# Patient Record
Sex: FEMALE | Race: ASIAN | NOT HISPANIC OR LATINO
[De-identification: names, ages, dates, MRNs, and addresses within clinical notes are randomized per-mention and may not be internally consistent; named-entity substitution may affect disease eponyms.]

---

## 2019-11-04 ENCOUNTER — APPOINTMENT (OUTPATIENT)
Dept: CARDIOLOGY | Facility: CLINIC | Age: 79
End: 2019-11-04

## 2019-12-04 ENCOUNTER — APPOINTMENT (OUTPATIENT)
Dept: CARDIOLOGY | Facility: CLINIC | Age: 79
End: 2019-12-04
Payer: MEDICARE

## 2019-12-04 ENCOUNTER — NON-APPOINTMENT (OUTPATIENT)
Age: 79
End: 2019-12-04

## 2019-12-04 VITALS
HEART RATE: 55 BPM | OXYGEN SATURATION: 96 % | RESPIRATION RATE: 16 BRPM | DIASTOLIC BLOOD PRESSURE: 82 MMHG | BODY MASS INDEX: 22.03 KG/M2 | WEIGHT: 129 LBS | SYSTOLIC BLOOD PRESSURE: 164 MMHG | TEMPERATURE: 97.4 F

## 2019-12-04 DIAGNOSIS — R00.2 PALPITATIONS: ICD-10-CM

## 2019-12-04 DIAGNOSIS — I34.0 NONRHEUMATIC MITRAL (VALVE) INSUFFICIENCY: ICD-10-CM

## 2019-12-04 DIAGNOSIS — R07.89 OTHER CHEST PAIN: ICD-10-CM

## 2019-12-04 PROCEDURE — 93000 ELECTROCARDIOGRAM COMPLETE: CPT | Mod: 59

## 2019-12-04 PROCEDURE — ZZZZZ: CPT

## 2019-12-04 PROCEDURE — 99214 OFFICE O/P EST MOD 30 MIN: CPT | Mod: 25

## 2019-12-04 PROCEDURE — 93015 CV STRESS TEST SUPVJ I&R: CPT

## 2019-12-04 PROCEDURE — 93306 TTE W/DOPPLER COMPLETE: CPT

## 2019-12-04 RX ORDER — HYDROCHLOROTHIAZIDE 12.5 MG/1
12.5 CAPSULE ORAL
Qty: 30 | Refills: 5 | Status: ACTIVE | COMMUNITY
Start: 2019-12-04 | End: 1900-01-01

## 2019-12-04 RX ORDER — BISOPROLOL FUMARATE 5 MG/1
5 TABLET, FILM COATED ORAL DAILY
Qty: 30 | Refills: 5 | Status: ACTIVE | COMMUNITY
Start: 2019-12-04 | End: 1900-01-01

## 2019-12-22 PROBLEM — R07.89 CHEST DISCOMFORT: Status: ACTIVE | Noted: 2019-12-04

## 2019-12-22 RX ORDER — BISOPROLOL FUMARATE AND HYDROCHLOROTHIAZIDE 5; 6.25 MG/1; MG/1
5-6.25 TABLET, FILM COATED ORAL DAILY
Qty: 30 | Refills: 5 | Status: DISCONTINUED | COMMUNITY
Start: 2019-12-04 | End: 2019-12-22

## 2020-02-13 ENCOUNTER — APPOINTMENT (OUTPATIENT)
Dept: CARDIOLOGY | Facility: CLINIC | Age: 80
End: 2020-02-13
Payer: MEDICARE

## 2020-02-13 VITALS
TEMPERATURE: 97.9 F | BODY MASS INDEX: 22.88 KG/M2 | OXYGEN SATURATION: 96 % | SYSTOLIC BLOOD PRESSURE: 127 MMHG | DIASTOLIC BLOOD PRESSURE: 72 MMHG | HEART RATE: 72 BPM | RESPIRATION RATE: 17 BRPM | WEIGHT: 134 LBS

## 2020-02-13 PROCEDURE — 99214 OFFICE O/P EST MOD 30 MIN: CPT

## 2020-02-18 NOTE — REASON FOR VISIT
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 Patient reports elevated BP in the mornings. Patient is advised to take Losartan 25 mg at night instead of during the day to help lower blood pressure in the mornings. She is on Bisoprolol 5 mg and HCTZ 12.5 mg. I advised patient to undergo an echocardiogram and a treadmill stress test.

## 2020-02-18 NOTE — PHYSICAL EXAM
[General Appearance - Well Developed] : well developed [Normal Appearance] : normal appearance [Well Groomed] : well groomed [General Appearance - Well Nourished] : well nourished [No Deformities] : no deformities [General Appearance - In No Acute Distress] : no acute distress [Normal Conjunctiva] : the conjunctiva exhibited no abnormalities [Eyelids - No Xanthelasma] : the eyelids demonstrated no xanthelasmas [Normal Oral Mucosa] : normal oral mucosa [No Oral Pallor] : no oral pallor [No Oral Cyanosis] : no oral cyanosis [Normal Jugular Venous A Waves Present] : normal jugular venous A waves present [Normal Jugular Venous V Waves Present] : normal jugular venous V waves present [No Jugular Venous Rosenbaum A Waves] : no jugular venous rosenbaum A waves [Exaggerated Use Of Accessory Muscles For Inspiration] : no accessory muscle use [Respiration, Rhythm And Depth] : normal respiratory rhythm and effort [Auscultation Breath Sounds / Voice Sounds] : lungs were clear to auscultation bilaterally [Heart Rate And Rhythm] : heart rate and rhythm were normal [Heart Sounds] : normal S1 and S2 [Arterial Pulses Normal] : the arterial pulses were normal [Edema] : no peripheral edema present [Systolic grade ___/6] : A grade [unfilled]/6 systolic murmur was heard. [Abdomen Soft] : soft [Abdomen Tenderness] : non-tender [Abdomen Mass (___ Cm)] : no abdominal mass palpated [Abnormal Walk] : normal gait [Nail Clubbing] : no clubbing of the fingernails [Cyanosis, Localized] : no localized cyanosis [Petechial Hemorrhages (___cm)] : no petechial hemorrhages [] : no ischemic changes [Oriented To Time, Place, And Person] : oriented to person, place, and time [Affect] : the affect was normal [Mood] : the mood was normal [No Anxiety] : not feeling anxious

## 2020-02-18 NOTE — REVIEW OF SYSTEMS
[Shortness Of Breath] : shortness of breath [Dyspnea on exertion] : dyspnea during exertion [Lower Ext Edema] : no extremity edema [Chest Pain] : no chest pain [Palpitations] : no palpitations [Negative] : Heme/Lymph

## 2020-02-18 NOTE — PHYSICAL EXAM
[Normal Appearance] : normal appearance [General Appearance - Well Developed] : well developed [Well Groomed] : well groomed [General Appearance - Well Nourished] : well nourished [No Deformities] : no deformities [Normal Conjunctiva] : the conjunctiva exhibited no abnormalities [General Appearance - In No Acute Distress] : no acute distress [Eyelids - No Xanthelasma] : the eyelids demonstrated no xanthelasmas [Normal Oral Mucosa] : normal oral mucosa [No Oral Cyanosis] : no oral cyanosis [No Oral Pallor] : no oral pallor [Normal Jugular Venous A Waves Present] : normal jugular venous A waves present [Normal Jugular Venous V Waves Present] : normal jugular venous V waves present [No Jugular Venous Rosenbaum A Waves] : no jugular venous rosenbaum A waves [Respiration, Rhythm And Depth] : normal respiratory rhythm and effort [Exaggerated Use Of Accessory Muscles For Inspiration] : no accessory muscle use [Auscultation Breath Sounds / Voice Sounds] : lungs were clear to auscultation bilaterally [Heart Rate And Rhythm] : heart rate and rhythm were normal [Heart Sounds] : normal S1 and S2 [Arterial Pulses Normal] : the arterial pulses were normal [Systolic grade ___/6] : A grade [unfilled]/6 systolic murmur was heard. [Edema] : no peripheral edema present [Abdomen Soft] : soft [Abdomen Tenderness] : non-tender [Abdomen Mass (___ Cm)] : no abdominal mass palpated [Abnormal Walk] : normal gait [Nail Clubbing] : no clubbing of the fingernails [Cyanosis, Localized] : no localized cyanosis [Petechial Hemorrhages (___cm)] : no petechial hemorrhages [] : no ischemic changes [Affect] : the affect was normal [Oriented To Time, Place, And Person] : oriented to person, place, and time [No Anxiety] : not feeling anxious [Mood] : the mood was normal

## 2020-02-18 NOTE — PHYSICAL EXAM
[General Appearance - Well Developed] : well developed [Normal Appearance] : normal appearance [Well Groomed] : well groomed [General Appearance - Well Nourished] : well nourished [No Deformities] : no deformities [General Appearance - In No Acute Distress] : no acute distress [Normal Conjunctiva] : the conjunctiva exhibited no abnormalities [Eyelids - No Xanthelasma] : the eyelids demonstrated no xanthelasmas [Normal Oral Mucosa] : normal oral mucosa [No Oral Pallor] : no oral pallor [No Oral Cyanosis] : no oral cyanosis [Normal Jugular Venous A Waves Present] : normal jugular venous A waves present [Normal Jugular Venous V Waves Present] : normal jugular venous V waves present [No Jugular Venous Rosenbaum A Waves] : no jugular venous rosenbaum A waves [Respiration, Rhythm And Depth] : normal respiratory rhythm and effort [Exaggerated Use Of Accessory Muscles For Inspiration] : no accessory muscle use [Auscultation Breath Sounds / Voice Sounds] : lungs were clear to auscultation bilaterally [Heart Rate And Rhythm] : heart rate and rhythm were normal [Arterial Pulses Normal] : the arterial pulses were normal [Heart Sounds] : normal S1 and S2 [Edema] : no peripheral edema present [Systolic grade ___/6] : A grade [unfilled]/6 systolic murmur was heard. [Abdomen Soft] : soft [Abdomen Tenderness] : non-tender [Abdomen Mass (___ Cm)] : no abdominal mass palpated [Abnormal Walk] : normal gait [Nail Clubbing] : no clubbing of the fingernails [Cyanosis, Localized] : no localized cyanosis [Petechial Hemorrhages (___cm)] : no petechial hemorrhages [] : no ischemic changes [Oriented To Time, Place, And Person] : oriented to person, place, and time [Affect] : the affect was normal [Mood] : the mood was normal [No Anxiety] : not feeling anxious

## 2020-02-18 NOTE — REASON FOR VISIT
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 Patient reports elevated BP in the mornings. Patient is advised to take Losartan 25 mg at night instead of during the day to help lower blood pressure in the mornings. She is on Bisoprolol 5 mg and HCTZ 12.5 mg. I advised patient to undergo an echocardiogram and a treadmill stress test.

## 2020-02-18 NOTE — REASON FOR VISIT
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 Patient reports elevated BP in the mornings. Patient is advised to take Losartan 25 mg at night instead of during the day to help lower blood pressure in the mornings. She is on Bisoprolol 5 mg and HCTZ 12.5 mg. I advised patient to undergo an echocardiogram and a treadmill stress test.

## 2020-09-12 NOTE — PHYSICAL EXAM
[General Appearance - Well Developed] : well developed [Normal Appearance] : normal appearance [Well Groomed] : well groomed [General Appearance - Well Nourished] : well nourished [No Deformities] : no deformities [General Appearance - In No Acute Distress] : no acute distress [Normal Conjunctiva] : the conjunctiva exhibited no abnormalities [Eyelids - No Xanthelasma] : the eyelids demonstrated no xanthelasmas [Normal Oral Mucosa] : normal oral mucosa [No Oral Pallor] : no oral pallor [Normal Jugular Venous A Waves Present] : normal jugular venous A waves present [No Oral Cyanosis] : no oral cyanosis [Normal Jugular Venous V Waves Present] : normal jugular venous V waves present [No Jugular Venous Rosenbaum A Waves] : no jugular venous rosenbaum A waves [Respiration, Rhythm And Depth] : normal respiratory rhythm and effort [Exaggerated Use Of Accessory Muscles For Inspiration] : no accessory muscle use [Auscultation Breath Sounds / Voice Sounds] : lungs were clear to auscultation bilaterally [Heart Rate And Rhythm] : heart rate and rhythm were normal [Heart Sounds] : normal S1 and S2 [Arterial Pulses Normal] : the arterial pulses were normal [Systolic grade ___/6] : A grade [unfilled]/6 systolic murmur was heard. [Edema] : no peripheral edema present [Abdomen Soft] : soft [Abdomen Tenderness] : non-tender [Abdomen Mass (___ Cm)] : no abdominal mass palpated [Abnormal Walk] : normal gait [Cyanosis, Localized] : no localized cyanosis [Petechial Hemorrhages (___cm)] : no petechial hemorrhages [Nail Clubbing] : no clubbing of the fingernails [Oriented To Time, Place, And Person] : oriented to person, place, and time [] : no ischemic changes [Affect] : the affect was normal [Mood] : the mood was normal [No Anxiety] : not feeling anxious

## 2020-09-14 ENCOUNTER — APPOINTMENT (OUTPATIENT)
Dept: CARDIOLOGY | Facility: CLINIC | Age: 80
End: 2020-09-14
Payer: MEDICARE

## 2020-09-14 ENCOUNTER — NON-APPOINTMENT (OUTPATIENT)
Age: 80
End: 2020-09-14

## 2020-09-14 VITALS
RESPIRATION RATE: 17 BRPM | SYSTOLIC BLOOD PRESSURE: 149 MMHG | WEIGHT: 135 LBS | TEMPERATURE: 97.6 F | DIASTOLIC BLOOD PRESSURE: 77 MMHG | OXYGEN SATURATION: 97 % | BODY MASS INDEX: 23.05 KG/M2 | HEART RATE: 63 BPM

## 2020-09-14 PROCEDURE — 93000 ELECTROCARDIOGRAM COMPLETE: CPT

## 2020-09-14 PROCEDURE — 99214 OFFICE O/P EST MOD 30 MIN: CPT

## 2020-09-16 NOTE — HISTORY OF PRESENT ILLNESS
[FreeTextEntry1] : 80-year-old female with HTN and breast CA presents for followup. \par \par Patient was last seen on 2/13/20.  She is on Bisoprolol and HCTZ along with Losartan 25 mg.\par \par OLD NOTE - \par \par  6/17/15. Patient underwent a stress echo and completed 5 minutes of Joel protocol. There were upsloping ST depressions on ECG but there was no echocardiographic evidence of ischemia. Patient returned today for followup.

## 2020-09-16 NOTE — REASON FOR VISIT
[FreeTextEntry1] : 9/14/20 - stopped both Bisoprolol HCTZ 5-6.25 mg and Losartan 25 mg because her BP was good.  Her BP increased so she is back on both for the past month. Patient reports feeling dizzy when she turns.   Patient reports SOB with exertion. Patient denies CP.  I advised patient to get CXR. Patient had high pulmonary pressure due to mitral valve regurgitation. I advised patient to keep her BP low.  I advised patient to undergo a pharmacologic nuclear stress test.  I will obtain insurance authorization. 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  Patient reports elevated BP in the mornings. Patient is advised to take Losartan 25 mg at night instead of during the day to help lower blood pressure in the mornings.  She is on Bisoprolol 5 mg and HCTZ 12.5 mg.

## 2020-10-06 ENCOUNTER — APPOINTMENT (OUTPATIENT)
Dept: CARDIOLOGY | Facility: CLINIC | Age: 80
End: 2020-10-06
Payer: MEDICARE

## 2020-10-06 PROCEDURE — A9500: CPT

## 2020-10-06 PROCEDURE — 93015 CV STRESS TEST SUPVJ I&R: CPT

## 2020-10-06 PROCEDURE — 78452 HT MUSCLE IMAGE SPECT MULT: CPT

## 2020-10-07 RX ORDER — ASPIRIN 81 MG/1
81 TABLET, DELAYED RELEASE ORAL DAILY
Qty: 30 | Refills: 5 | Status: ACTIVE | COMMUNITY
Start: 2020-10-07 | End: 1900-01-01

## 2020-10-16 NOTE — DISCUSSION/SUMMARY
[FreeTextEntry1] : 79-year-old female with HTN and breast CA presents for followup.  Patient was last seen on 6/17/15.  Patient underwent a stress echo and completed 5 minutes of Joel protocol.  There were upsloping ST depressions on ECG but there was no echocardiographic evidence of ischemia.  Patient returned today for followup.  Patient reports occasional CP, described as palpitations or anxious feeling lasting seconds. Patient reports COLLINS.  She is on Ziac 5-6.25 mg for HTN.\par \par (1) CP, SOB with exertion - Patient underwent an echocardiogram and it showed normal LV function with moderate MR, AR, TR, and mild pulmonary hypertension (PASP 46 mmHg).  Patient underwent a treadmill stress test and completed 5 minutes of Joel protocol.  There were upsloping ST depressions on ECG but no symptoms.  Following treadmill stress, there was no echocardiographic evidence of ischemia.  PASP increased to 60 mg following treadmill stress.  I advised patient to change Ziac 5-6.25 mg to Bisoprolol 5 mg and HCTZ 12.5 mg.\par \par (2) Followup - 3 months.

## 2020-10-16 NOTE — HISTORY OF PRESENT ILLNESS
[FreeTextEntry1] : 79-year-old female with HTN and breast CA presents for followup.  Patient was last seen on 6/17/15.  Patient underwent a stress echo and completed 5 minutes of Joel protocol.  There were upsloping ST depressions on ECG but there was no echocardiographic evidence of ischemia.  Patient returned today for followup.  Patient reports occasional CP, described as palpitations or anxious feeling lasting seconds. Patient reports COLLINS.  She is on Ziac 5-6.25 mg for HTN.

## 2021-01-04 NOTE — PHYSICAL EXAM
[General Appearance - Well Developed] : well developed [Normal Appearance] : normal appearance [Well Groomed] : well groomed [General Appearance - Well Nourished] : well nourished [No Deformities] : no deformities [General Appearance - In No Acute Distress] : no acute distress [Normal Conjunctiva] : the conjunctiva exhibited no abnormalities [Eyelids - No Xanthelasma] : the eyelids demonstrated no xanthelasmas [Normal Oral Mucosa] : normal oral mucosa [No Oral Pallor] : no oral pallor [No Oral Cyanosis] : no oral cyanosis [Normal Jugular Venous A Waves Present] : normal jugular venous A waves present [Normal Jugular Venous V Waves Present] : normal jugular venous V waves present [No Jugular Venous Rosenbaum A Waves] : no jugular venous rosenbaum A waves [Respiration, Rhythm And Depth] : normal respiratory rhythm and effort [Exaggerated Use Of Accessory Muscles For Inspiration] : no accessory muscle use [Auscultation Breath Sounds / Voice Sounds] : lungs were clear to auscultation bilaterally [Heart Rate And Rhythm] : heart rate and rhythm were normal [Heart Sounds] : normal S1 and S2 [Arterial Pulses Normal] : the arterial pulses were normal [Edema] : no peripheral edema present [Systolic grade ___/6] : A grade [unfilled]/6 systolic murmur was heard. [Abdomen Soft] : soft [Abdomen Tenderness] : non-tender [Abdomen Mass (___ Cm)] : no abdominal mass palpated [Abnormal Walk] : normal gait [Nail Clubbing] : no clubbing of the fingernails [Cyanosis, Localized] : no localized cyanosis [Petechial Hemorrhages (___cm)] : no petechial hemorrhages [] : no ischemic changes [Oriented To Time, Place, And Person] : oriented to person, place, and time [Affect] : the affect was normal [Mood] : the mood was normal [No Anxiety] : not feeling anxious

## 2021-01-06 ENCOUNTER — APPOINTMENT (OUTPATIENT)
Dept: CARDIOLOGY | Facility: CLINIC | Age: 81
End: 2021-01-06
Payer: MEDICARE

## 2021-01-06 VITALS
BODY MASS INDEX: 23.56 KG/M2 | HEART RATE: 68 BPM | WEIGHT: 138 LBS | RESPIRATION RATE: 18 BRPM | TEMPERATURE: 97.5 F | DIASTOLIC BLOOD PRESSURE: 92 MMHG | SYSTOLIC BLOOD PRESSURE: 174 MMHG | OXYGEN SATURATION: 96 %

## 2021-01-06 PROCEDURE — 93306 TTE W/DOPPLER COMPLETE: CPT

## 2021-01-06 PROCEDURE — 99214 OFFICE O/P EST MOD 30 MIN: CPT

## 2021-01-06 PROCEDURE — 99072 ADDL SUPL MATRL&STAF TM PHE: CPT

## 2021-01-06 RX ORDER — LOSARTAN POTASSIUM 50 MG/1
50 TABLET, FILM COATED ORAL DAILY
Qty: 30 | Refills: 5 | Status: ACTIVE | COMMUNITY
Start: 1900-01-01 | End: 1900-01-01

## 2021-02-03 NOTE — DISCUSSION/SUMMARY
[FreeTextEntry1] : 80-year-old female with possible CAD (mild distal LAD ischemia on nuclear stress), HTN and breast CA presents for followup. \par \par Patient was last seen on 9/14/20 for SOB.  Patient underwent a pharmacologic nuclear stress test and it showed mild distal LAD ischemia.   I advised patient to start ASA 81 mg and Simvastatin 10 mg.  She is on Bisoprolol and HCTZ along with Losartan 25 mg for HTN.\par \par 1/6/21 - Patient has been stable.  Patient denies CP.  Patient reports stable SOB with stairs.  Patient denies palpitations.  She is taking her medications.  Her SBP at home ranges from 110-150.  Her BP was elevated initially but was acceptable on repeat measurement (140/80).  I advised patient to undergo an echocardiogram.  I advised patient to increase Losartan to 50 mg.\par \par (1) Possible CAD (mild distal LAD ischemia on nuclear stress) - Patient has been stable.  I advised patient to continue ASA 81 mg and Simvastatin 10 mg.\par \par (2) MR, AR, TR - Patient underwent an echocardiogram and it showed normal LV function with mild MR, moderate AR, and moderate TR, not significantly changed.  Patient was reassured. \par \par (3) HTN - Her BP was elevated.  I advised patient to increase Losartan to 50 mg.\par \par (4) Followup - 6 months. \par

## 2021-02-03 NOTE — HISTORY OF PRESENT ILLNESS
[FreeTextEntry1] : 80-year-old female with possible CAD (mild distal LAD ischemia on nuclear stress), HTN and breast CA presents for followup. \par \par Patient was last seen on 9/14/20 for SOB.  Patient underwent a pharmacologic nuclear stress test and it showed mild distal LAD ischemia.   I advised patient to start ASA 81 mg and Simvastatin 10 mg.  She is on Bisoprolol and HCTZ along with Losartan 25 mg for HTN.\par \par 1/6/21 - Patient has been stable.  Patient denies CP.  Patient reports stable SOB with stairs.  Patient denies palpitations.  She is taking her medications.  Her SBP at home ranges from 110-150.  Her BP was elevated initially but was acceptable on repeat measurement (140/80).  I advised patient to undergo an echocardiogram.  I advised patient to increase Losartan to 50 mg.\par \par

## 2021-02-03 NOTE — REASON FOR VISIT
[Follow-Up - Clinic] : a clinic follow-up of [Hypertension] : hypertension [FreeTextEntry1] : 1/6/21 - Patient has been stable.  Patient denies CP.  Patient reports stable SOB with stairs.  Patient denies palpitations.  She is taking her medications.  Her SBP at home ranges from 110-150.  Her BP was elevated initially but was acceptable on repeat measurement (140/80).  I advised patient to undergo an echocardiogram.  I advised patient to increase Losartan to 50 mg.\par \par 9/14/20 - stopped both Bisoprolol HCTZ 5-6.25 mg and Losartan 25 mg because her BP was good.  Her BP increased so she is back on both for the past month. Patient reports feeling dizzy when she turns.   Patient reports SOB with exertion. Patient denies CP.  I advised patient to get CXR. Patient had high pulmonary pressure due to mitral valve regurgitation. I advised patient to keep her BP low.  I advised patient to undergo a pharmacologic nuclear stress test.  I will obtain insurance authorization. 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  Patient reports elevated BP in the mornings. Patient is advised to take Losartan 25 mg at night instead of during the day to help lower blood pressure in the mornings.  She is on Bisoprolol 5 mg and HCTZ 12.5 mg.

## 2021-05-15 NOTE — PHYSICAL EXAM
[Normal Appearance] : normal appearance [General Appearance - Well Developed] : well developed [Well Groomed] : well groomed [General Appearance - Well Nourished] : well nourished [No Deformities] : no deformities [General Appearance - In No Acute Distress] : no acute distress [Normal Conjunctiva] : the conjunctiva exhibited no abnormalities [Eyelids - No Xanthelasma] : the eyelids demonstrated no xanthelasmas [Normal Oral Mucosa] : normal oral mucosa [No Oral Pallor] : no oral pallor [No Oral Cyanosis] : no oral cyanosis [Normal Jugular Venous A Waves Present] : normal jugular venous A waves present [Normal Jugular Venous V Waves Present] : normal jugular venous V waves present [No Jugular Venous Rosenbaum A Waves] : no jugular venous rosenbaum A waves [Respiration, Rhythm And Depth] : normal respiratory rhythm and effort [Exaggerated Use Of Accessory Muscles For Inspiration] : no accessory muscle use [Auscultation Breath Sounds / Voice Sounds] : lungs were clear to auscultation bilaterally [Heart Sounds] : normal S1 and S2 [Heart Rate And Rhythm] : heart rate and rhythm were normal [Arterial Pulses Normal] : the arterial pulses were normal [Edema] : no peripheral edema present [Systolic grade ___/6] : A grade [unfilled]/6 systolic murmur was heard. [Abdomen Soft] : soft [Abdomen Tenderness] : non-tender [Abdomen Mass (___ Cm)] : no abdominal mass palpated [Abnormal Walk] : normal gait [Nail Clubbing] : no clubbing of the fingernails [Cyanosis, Localized] : no localized cyanosis [Petechial Hemorrhages (___cm)] : no petechial hemorrhages [] : no ischemic changes [Affect] : the affect was normal [Oriented To Time, Place, And Person] : oriented to person, place, and time [Mood] : the mood was normal [No Anxiety] : not feeling anxious

## 2021-05-19 ENCOUNTER — APPOINTMENT (OUTPATIENT)
Dept: CARDIOLOGY | Facility: CLINIC | Age: 81
End: 2021-05-19
Payer: MEDICARE

## 2021-05-19 ENCOUNTER — NON-APPOINTMENT (OUTPATIENT)
Age: 81
End: 2021-05-19

## 2021-05-19 VITALS
BODY MASS INDEX: 23.9 KG/M2 | WEIGHT: 140 LBS | DIASTOLIC BLOOD PRESSURE: 71 MMHG | SYSTOLIC BLOOD PRESSURE: 131 MMHG | HEART RATE: 82 BPM | OXYGEN SATURATION: 95 % | RESPIRATION RATE: 18 BRPM | TEMPERATURE: 97 F

## 2021-05-19 PROCEDURE — 93000 ELECTROCARDIOGRAM COMPLETE: CPT

## 2021-05-19 PROCEDURE — 99072 ADDL SUPL MATRL&STAF TM PHE: CPT

## 2021-05-19 PROCEDURE — 99214 OFFICE O/P EST MOD 30 MIN: CPT

## 2021-05-19 NOTE — HISTORY OF PRESENT ILLNESS
[FreeTextEntry1] : 80-year-old female with possible CAD (mild distal LAD ischemia on nuclear stress), HTN and breast CA presents for followup. \par \par Patient was last seen on 1/6/21.  Patient underwent an echocardiogram and it showed normal LV function with moderate AR and TR.  I advised patient to increase Losartan to 50 mg.  She is on ASA 81 mg and Simvastatin 10 mg for cardioprotection.  She is on Bisoprolol and HCTZ for HTN.\par \par Last nuclear was on 10/6/20 and it showed mild distal LAD ischemia.

## 2021-05-19 NOTE — REASON FOR VISIT
Home [Symptom and Test Evaluation] : symptom and test evaluation [Hypertension] : hypertension [FreeTextEntry1] : 5/19/21 - Patient reports low BP with Losartan so she stopped.  She is onl Bisoprolol HCTZ only for HTN.  She is on ASA 81 mg and Simvastatin 10 mg for HTN.  Patient reports COLLINS.  Patient denies CP.  I advised patient to check ProBNP.  I advised patient to have a CXR to rule out lung pathology.  I will consider cardiac cath if CXR is normal.\par \par 1/6/21 - Patient has been stable.  Patient denies CP.  Patient reports stable SOB with stairs.  Patient denies palpitations.  She is taking her medications.  Her SBP at home ranges from 110-150.  Her BP was elevated initially but was acceptable on repeat measurement (140/80).  I advised patient to undergo an echocardiogram.  I advised patient to increase Losartan to 50 mg.\par \par 9/14/20 - stopped both Bisoprolol HCTZ 5-6.25 mg and Losartan 25 mg because her BP was good.  Her BP increased so she is back on both for the past month. Patient reports feeling dizzy when she turns.   Patient reports SOB with exertion. Patient denies CP.  I advised patient to get CXR. Patient had high pulmonary pressure due to mitral valve regurgitation. I advised patient to keep her BP low.  I advised patient to undergo a pharmacologic nuclear stress test.  I will obtain insurance authorization. 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  Patient reports elevated BP in the mornings. Patient is advised to take Losartan 25 mg at night instead of during the day to help lower blood pressure in the mornings.  She is on Bisoprolol 5 mg and HCTZ 12.5 mg.

## 2021-05-20 LAB
ALBUMIN SERPL ELPH-MCNC: 4.6 G/DL
ALP BLD-CCNC: 45 U/L
ALT SERPL-CCNC: 20 U/L
ANION GAP SERPL CALC-SCNC: 14 MMOL/L
AST SERPL-CCNC: 32 U/L
BILIRUB SERPL-MCNC: 0.8 MG/DL
BUN SERPL-MCNC: 15 MG/DL
CALCIUM SERPL-MCNC: 10.3 MG/DL
CHLORIDE SERPL-SCNC: 103 MMOL/L
CO2 SERPL-SCNC: 23 MMOL/L
CREAT SERPL-MCNC: 0.79 MG/DL
GLUCOSE SERPL-MCNC: 126 MG/DL
NT-PROBNP SERPL-MCNC: 82 PG/ML
POTASSIUM SERPL-SCNC: 3.6 MMOL/L
PROT SERPL-MCNC: 7 G/DL
SODIUM SERPL-SCNC: 140 MMOL/L

## 2021-06-10 RX ORDER — SIMVASTATIN 10 MG/1
10 TABLET, FILM COATED ORAL
Qty: 30 | Refills: 5 | Status: ACTIVE | COMMUNITY
Start: 2020-10-07 | End: 1900-01-01

## 2021-09-20 ENCOUNTER — APPOINTMENT (OUTPATIENT)
Dept: CARDIOLOGY | Facility: CLINIC | Age: 81
End: 2021-09-20
Payer: MEDICARE

## 2021-09-20 VITALS — SYSTOLIC BLOOD PRESSURE: 162 MMHG | DIASTOLIC BLOOD PRESSURE: 84 MMHG

## 2021-09-20 VITALS
SYSTOLIC BLOOD PRESSURE: 119 MMHG | DIASTOLIC BLOOD PRESSURE: 74 MMHG | WEIGHT: 138 LBS | TEMPERATURE: 97.8 F | BODY MASS INDEX: 23.56 KG/M2 | RESPIRATION RATE: 18 BRPM | OXYGEN SATURATION: 95 % | HEART RATE: 79 BPM

## 2021-09-20 PROCEDURE — 99214 OFFICE O/P EST MOD 30 MIN: CPT

## 2021-09-20 NOTE — REASON FOR VISIT
[Symptom and Test Evaluation] : symptom and test evaluation [FreeTextEntry1] : 9/20/21 - Patient reports stable COLLINS.  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  Patient reports her SBP in AM is 140/80 and then it goes down to 110-120 in PM.  She is on Bisoprolol 5 mg and HCTZ.  Her BP was elevated.  Her home BP was verified to be accurate.  I advised patient to exercise regularly to see if her SOB gets better.  I will consider cardiac cath if she remains symptomatic.  FU 3 months. \par \par 5/19/21 - Patient reports low BP with Losartan so she stopped.  She is on Bisoprolol HCTZ only for HTN.  She is on ASA 81 mg and Simvastatin 10 mg for HTN.  Patient reports COLLINS.  Patient denies CP.  I advised patient to check ProBNP.  I advised patient to have a CXR to rule out lung pathology.  I will consider cardiac cath if CXR is normal.\par \par 1/6/21 - Patient has been stable.  Patient denies CP.  Patient reports stable SOB with stairs.  Patient denies palpitations.  She is taking her medications.  Her SBP at home ranges from 110-150.  Her BP was elevated initially but was acceptable on repeat measurement (140/80).  I advised patient to undergo an echocardiogram.  I advised patient to increase Losartan to 50 mg.\par \par 9/14/20 - stopped both Bisoprolol HCTZ 5-6.25 mg and Losartan 25 mg because her BP was good.  Her BP increased so she is back on both for the past month. Patient reports feeling dizzy when she turns.   Patient reports SOB with exertion. Patient denies CP.  I advised patient to get CXR. Patient had high pulmonary pressure due to mitral valve regurgitation. I advised patient to keep her BP low.  I advised patient to undergo a pharmacologic nuclear stress test.  I will obtain insurance authorization. 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  Patient reports elevated BP in the mornings. Patient is advised to take Losartan 25 mg at night instead of during the day to help lower blood pressure in the mornings.  She is on Bisoprolol 5 mg and HCTZ 12.5 mg.

## 2021-09-20 NOTE — HISTORY OF PRESENT ILLNESS
[FreeTextEntry1] : 81-year-old female with possible CAD (mild distal LAD ischemia on nuclear stress), HTN and breast CA presents for followup. \par \par Patient was last seen on 5/19/21 for COLLINS.   She is off Losartan 50 mg because of low BP.  ProBNP was checked and it was normal.  Patient underwent a CXR and it showed stable scarring. \par \par She is on ASA 81 mg and Simvastatin 10 mg for cardioprotection.  She is on Bisoprolol and HCTZ for HTN.\par \par Last nuclear was on 10/6/20 and it showed mild distal LAD ischemia. \par \par Patient underwent an echocardiogram 1/6/21 and it showed normal LV function with moderate AR and TR.

## 2022-02-12 NOTE — REASON FOR VISIT
[FreeTextEntry1] : 81-year-old female with possible CAD (mild distal LAD ischemia on nuclear stress), HTN and breast CA presents for followup. \par \par Patient was last seen on 9/20/21 for COLLISN.   Her BP was elevated in office but normal at home.  Her home BP was verified to be accurate.  I advised patient to exercise regularly to see if her SOB gets better.  I wouldconsider cardiac cath if she remains symptomatic.  \par \par She is on ASA 81 mg and Simvastatin 10 mg for cardioprotection.  She is on Bisoprolol and HCTZ for HTN.\par \par Last nuclear was on 10/6/20 and it showed mild distal LAD ischemia. \par \par Patient underwent an echocardiogram 1/6/21 and it showed normal LV function with moderate AR and TR. \par \par

## 2022-02-12 NOTE — HISTORY OF PRESENT ILLNESS
[FreeTextEntry1] : 9/20/21 - Patient reports stable COLLINS.  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  Patient reports her SBP in AM is 140/80 and then it goes down to 110-120 in PM.  She is on Bisoprolol 5 mg and HCTZ.  Her BP was elevated.  Her home BP was verified to be accurate.  I advised patient to exercise regularly to see if her SOB gets better.  I will consider cardiac cath if she remains symptomatic.  FU 3 months. \par \par 5/19/21 - Patient reports low BP with Losartan so she stopped.  She is on Bisoprolol HCTZ only for HTN.  She is on ASA 81 mg and Simvastatin 10 mg for HTN.  Patient reports COLLINS.  Patient denies CP.  I advised patient to check ProBNP.  I advised patient to have a CXR to rule out lung pathology.  I will consider cardiac cath if CXR is normal.\par \par 1/6/21 - Patient has been stable.  Patient denies CP.  Patient reports stable SOB with stairs.  Patient denies palpitations.  She is taking her medications.  Her SBP at home ranges from 110-150.  Her BP was elevated initially but was acceptable on repeat measurement (140/80).  I advised patient to undergo an echocardiogram.  I advised patient to increase Losartan to 50 mg.\par \par 9/14/20 - stopped both Bisoprolol HCTZ 5-6.25 mg and Losartan 25 mg because her BP was good.  Her BP increased so she is back on both for the past month. Patient reports feeling dizzy when she turns.   Patient reports SOB with exertion. Patient denies CP.  I advised patient to get CXR. Patient had high pulmonary pressure due to mitral valve regurgitation. I advised patient to keep her BP low.  I advised patient to undergo a pharmacologic nuclear stress test.  I will obtain insurance authorization. 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  Patient reports elevated BP in the mornings. Patient is advised to take Losartan 25 mg at night instead of during the day to help lower blood pressure in the mornings.  She is on Bisoprolol 5 mg and HCTZ 12.5 mg.

## 2022-02-16 ENCOUNTER — NON-APPOINTMENT (OUTPATIENT)
Age: 82
End: 2022-02-16

## 2022-02-16 ENCOUNTER — APPOINTMENT (OUTPATIENT)
Dept: CARDIOLOGY | Facility: CLINIC | Age: 82
End: 2022-02-16
Payer: MEDICARE

## 2022-02-16 VITALS
HEART RATE: 68 BPM | WEIGHT: 139 LBS | RESPIRATION RATE: 18 BRPM | SYSTOLIC BLOOD PRESSURE: 148 MMHG | DIASTOLIC BLOOD PRESSURE: 76 MMHG | BODY MASS INDEX: 23.73 KG/M2 | OXYGEN SATURATION: 94 % | TEMPERATURE: 97.2 F

## 2022-02-16 PROCEDURE — 99214 OFFICE O/P EST MOD 30 MIN: CPT

## 2022-02-16 PROCEDURE — 93000 ELECTROCARDIOGRAM COMPLETE: CPT

## 2022-03-01 ENCOUNTER — APPOINTMENT (OUTPATIENT)
Dept: CARDIOLOGY | Facility: CLINIC | Age: 82
End: 2022-03-01
Payer: MEDICARE

## 2022-03-01 PROCEDURE — 93015 CV STRESS TEST SUPVJ I&R: CPT

## 2022-03-01 PROCEDURE — 78452 HT MUSCLE IMAGE SPECT MULT: CPT

## 2022-03-01 PROCEDURE — A9500: CPT

## 2022-03-03 ENCOUNTER — NON-APPOINTMENT (OUTPATIENT)
Age: 82
End: 2022-03-03

## 2023-01-12 ENCOUNTER — APPOINTMENT (OUTPATIENT)
Dept: CARDIOLOGY | Facility: CLINIC | Age: 83
End: 2023-01-12
Payer: MEDICARE

## 2023-01-12 VITALS
SYSTOLIC BLOOD PRESSURE: 152 MMHG | WEIGHT: 134 LBS | TEMPERATURE: 96.8 F | OXYGEN SATURATION: 95 % | BODY MASS INDEX: 22.88 KG/M2 | RESPIRATION RATE: 16 BRPM | DIASTOLIC BLOOD PRESSURE: 78 MMHG | HEART RATE: 72 BPM

## 2023-01-12 PROCEDURE — 93306 TTE W/DOPPLER COMPLETE: CPT

## 2023-01-12 PROCEDURE — 99214 OFFICE O/P EST MOD 30 MIN: CPT

## 2023-01-16 LAB
ALBUMIN SERPL ELPH-MCNC: 4.3 G/DL
ALP BLD-CCNC: 71 U/L
ALT SERPL-CCNC: 20 U/L
ANION GAP SERPL CALC-SCNC: 12 MMOL/L
AST SERPL-CCNC: 39 U/L
BILIRUB SERPL-MCNC: 0.8 MG/DL
BUN SERPL-MCNC: 14 MG/DL
CALCIUM SERPL-MCNC: 9.9 MG/DL
CHLORIDE SERPL-SCNC: 102 MMOL/L
CO2 SERPL-SCNC: 25 MMOL/L
CREAT SERPL-MCNC: 0.68 MG/DL
DEPRECATED D DIMER PPP IA-ACNC: 177 NG/ML DDU
EGFR: 86 ML/MIN/1.73M2
GLUCOSE SERPL-MCNC: 146 MG/DL
NT-PROBNP SERPL-MCNC: 132 PG/ML
POTASSIUM SERPL-SCNC: 3.9 MMOL/L
PROT SERPL-MCNC: 7 G/DL
SODIUM SERPL-SCNC: 140 MMOL/L

## 2023-04-10 NOTE — REASON FOR VISIT
[FreeTextEntry1] : 82-year-old female with possible CAD (mild distal LAD ischemia on nuclear stress), HTN, breast CA, presents for followup. \par \par Patient was last seen on 2/16/22 for followup.  Patient reported COLLINS.  Patient reported dizziness.  Patient was orthostatic (-120-100).   Patient underwent a pharmacologic nuclear stress test and it showed normal myocardial perfusion.  \par \par She is on ASA 81 mg and Simvastatin 10 mg for cardioprotection.  She is on Bisoprolol- HCTZ 5-6.25 mg and Losartan 25 mg for HTN.\par \par Patient underwent a pharmacologic nuclear stress test and it showed normal myocardial perfusion. \par \par Patient underwent an echocardiogram 1/6/21 and it showed normal LV function with moderate AR and TR.\par \par Nuclear stress on 10/6/20 showed mild distal LAD ischemia. \par

## 2023-04-10 NOTE — HISTORY OF PRESENT ILLNESS
[FreeTextEntry1] : 1/12/23 - Patient reports COLLINS. Patient denies CP. Patient denies palpitations. Patient denies lightheadedness. Her BP in the morning is elevated and is 110 in the evenings.  [place]. I advised patient that she may stop ASA 81 mg and continue on statins. I advised patient to undergo an echocardiogram.  I advised patient to consider getting CTA.\par \par 2/16/22 - Patient reports COLLINS.  Patient reports dizziness.  Patient was orthostatic (-120-100).  2/6 BEATRICE at apex  noted.  She is on Bisoprolol-HCTZ 5-6.25 mg and Losartan 25 mg for HTN.  I advised patient to undergo a pharmacologic nuclear stress test.  I will obtain insurance authorization.  Patient underwent a pharmacologic nuclear stress test and it showed normal myocardial perfusion.  FU 6 months. \par \par 9/20/21 - Patient reports stable COLLINS.  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  Patient reports her SBP in AM is 140/80 and then it goes down to 110-120 in PM.  She is on Bisoprolol 5 mg and HCTZ.  Her BP was elevated.  Her home BP was verified to be accurate.  I advised patient to exercise regularly to see if her SOB gets better.  I will consider cardiac cath if she remains symptomatic.  FU 3 months. \par \par 5/19/21 - Patient reports low BP with Losartan so she stopped.  She is on Bisoprolol HCTZ only for HTN.  She is on ASA 81 mg and Simvastatin 10 mg for HTN.  Patient reports COLLINS.  Patient denies CP.  I advised patient to check ProBNP.  I advised patient to have a CXR to rule out lung pathology.  I will consider cardiac cath if CXR is normal.\par \par 1/6/21 - Patient has been stable.  Patient denies CP.  Patient reports stable SOB with stairs.  Patient denies palpitations.  She is taking her medications.  Her SBP at home ranges from 110-150.  Her BP was elevated initially but was acceptable on repeat measurement (140/80).  I advised patient to undergo an echocardiogram.  I advised patient to increase Losartan to 50 mg.\par \par 9/14/20 - stopped both Bisoprolol HCTZ 5-6.25 mg and Losartan 25 mg because her BP was good.  Her BP increased so she is back on both for the past month. Patient reports feeling dizzy when she turns.   Patient reports SOB with exertion. Patient denies CP.  I advised patient to get CXR. Patient had high pulmonary pressure due to mitral valve regurgitation. I advised patient to keep her BP low.  I advised patient to undergo a pharmacologic nuclear stress test.  I will obtain insurance authorization. 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  Patient reports elevated BP in the mornings. Patient is advised to take Losartan 25 mg at night instead of during the day to help lower blood pressure in the mornings.  She is on Bisoprolol 5 mg and HCTZ 12.5 mg.

## 2023-07-12 NOTE — PHYSICAL EXAM
[General Appearance - Well Developed] : well developed [Normal Appearance] : normal appearance [Well Groomed] : well groomed [General Appearance - Well Nourished] : well nourished [No Deformities] : no deformities [Normal Conjunctiva] : the conjunctiva exhibited no abnormalities [General Appearance - In No Acute Distress] : no acute distress [Normal Oral Mucosa] : normal oral mucosa [Eyelids - No Xanthelasma] : the eyelids demonstrated no xanthelasmas [No Oral Pallor] : no oral pallor [No Oral Cyanosis] : no oral cyanosis [Normal Jugular Venous A Waves Present] : normal jugular venous A waves present [Normal Jugular Venous V Waves Present] : normal jugular venous V waves present [No Jugular Venous Rosenbaum A Waves] : no jugular venous rosenbaum A waves [Respiration, Rhythm And Depth] : normal respiratory rhythm and effort [Exaggerated Use Of Accessory Muscles For Inspiration] : no accessory muscle use [Auscultation Breath Sounds / Voice Sounds] : lungs were clear to auscultation bilaterally [Heart Rate And Rhythm] : heart rate and rhythm were normal [Heart Sounds] : normal S1 and S2 [Arterial Pulses Normal] : the arterial pulses were normal [Edema] : no peripheral edema present [Systolic grade ___/6] : A grade [unfilled]/6 systolic murmur was heard. [Abdomen Soft] : soft [Abdomen Tenderness] : non-tender [Abdomen Mass (___ Cm)] : no abdominal mass palpated [Abnormal Walk] : normal gait [Nail Clubbing] : no clubbing of the fingernails [Cyanosis, Localized] : no localized cyanosis [Petechial Hemorrhages (___cm)] : no petechial hemorrhages [] : no ischemic changes [Oriented To Time, Place, And Person] : oriented to person, place, and time [Affect] : the affect was normal [Mood] : the mood was normal [No Anxiety] : not feeling anxious

## 2023-07-13 ENCOUNTER — APPOINTMENT (OUTPATIENT)
Dept: CARDIOLOGY | Facility: CLINIC | Age: 83
End: 2023-07-13
Payer: MEDICARE

## 2023-07-13 VITALS
RESPIRATION RATE: 16 BRPM | OXYGEN SATURATION: 95 % | DIASTOLIC BLOOD PRESSURE: 80 MMHG | BODY MASS INDEX: 22.71 KG/M2 | HEART RATE: 67 BPM | TEMPERATURE: 96.7 F | SYSTOLIC BLOOD PRESSURE: 147 MMHG | WEIGHT: 133 LBS

## 2023-07-13 PROCEDURE — 99214 OFFICE O/P EST MOD 30 MIN: CPT

## 2023-07-18 LAB
ALBUMIN SERPL ELPH-MCNC: 4.5 G/DL
ALP BLD-CCNC: 54 U/L
ALT SERPL-CCNC: 20 U/L
ANION GAP SERPL CALC-SCNC: 15 MMOL/L
AST SERPL-CCNC: 39 U/L
BILIRUB SERPL-MCNC: 1 MG/DL
BUN SERPL-MCNC: 18 MG/DL
CALCIUM SERPL-MCNC: 10.3 MG/DL
CHLORIDE SERPL-SCNC: 101 MMOL/L
CHOLEST SERPL-MCNC: 160 MG/DL
CO2 SERPL-SCNC: 25 MMOL/L
CREAT SERPL-MCNC: 0.82 MG/DL
EGFR: 70 ML/MIN/1.73M2
GLUCOSE SERPL-MCNC: 125 MG/DL
HDLC SERPL-MCNC: 53 MG/DL
LDLC SERPL CALC-MCNC: 67 MG/DL
NONHDLC SERPL-MCNC: 107 MG/DL
NT-PROBNP SERPL-MCNC: 62 PG/ML
POTASSIUM SERPL-SCNC: 4 MMOL/L
PROT SERPL-MCNC: 7.3 G/DL
SODIUM SERPL-SCNC: 141 MMOL/L
TRIGL SERPL-MCNC: 247 MG/DL

## 2023-07-23 NOTE — HISTORY OF PRESENT ILLNESS
[FreeTextEntry1] : 7/13/23 - Patient reports Covid infection in June w. s/s of cough and fatigue. She still c/o occasional cough and running nose in am. Patient reports stable COLLINS. She denies CP or palpitations. She has been holding Losartan 25 mg because her BP was 90s occasionally. Her BP ranges 110-130s recently.  Patient last had BT on 5/2023. \par \par 1/12/23 - Patient reports COLLINS. Patient denies CP. Patient denies palpitations. Patient denies lightheadedness. Her BP in the morning is elevated and is 110 in the evenings.  Patient [place]. I advised patient that she may stop ASA 81 mg and continue on statins. I advised patient to undergo an echocardiogram.  I advised patient to consider getting CTA.\par \par 2/16/22 - Patient reports COLLINS.  Patient reports dizziness.  Patient was orthostatic (-120-100).  2/6 BEATRICE at apex  noted.  She is on Bisoprolol-HCTZ 5-6.25 mg and Losartan 25 mg for HTN.  I advised patient to undergo a pharmacologic nuclear stress test.  I will obtain insurance authorization.  Patient underwent a pharmacologic nuclear stress test and it showed normal myocardial perfusion.  FU 6 months. \par \par 9/20/21 - Patient reports stable COLLINS.  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  Patient reports her SBP in AM is 140/80 and then it goes down to 110-120 in PM.  She is on Bisoprolol 5 mg and HCTZ.  Her BP was elevated.  Her home BP was verified to be accurate.  I advised patient to exercise regularly to see if her SOB gets better.  I will consider cardiac cath if she remains symptomatic.  FU 3 months. \par \par 5/19/21 - Patient reports low BP with Losartan so she stopped.  She is on Bisoprolol HCTZ only for HTN.  She is on ASA 81 mg and Simvastatin 10 mg for HTN.  Patient reports COLLINS.  Patient denies CP.  I advised patient to check ProBNP.  I advised patient to have a CXR to rule out lung pathology.  I will consider cardiac cath if CXR is normal.\par \par 1/6/21 - Patient has been stable.  Patient denies CP.  Patient reports stable SOB with stairs.  Patient denies palpitations.  She is taking her medications.  Her SBP at home ranges from 110-150.  Her BP was elevated initially but was acceptable on repeat measurement (140/80).  I advised patient to undergo an echocardiogram.  I advised patient to increase Losartan to 50 mg.\par \par 9/14/20 - stopped both Bisoprolol HCTZ 5-6.25 mg and Losartan 25 mg because her BP was good.  Her BP increased so she is back on both for the past month. Patient reports feeling dizzy when she turns.   Patient reports SOB with exertion. Patient denies CP.  I advised patient to get CXR. Patient had high pulmonary pressure due to mitral valve regurgitation. I advised patient to keep her BP low.  I advised patient to undergo a pharmacologic nuclear stress test.  I will obtain insurance authorization. 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  Patient reports elevated BP in the mornings. Patient is advised to take Losartan 25 mg at night instead of during the day to help lower blood pressure in the mornings.  She is on Bisoprolol 5 mg and HCTZ 12.5 mg.

## 2023-07-23 NOTE — REASON FOR VISIT
[FreeTextEntry1] : 82 year-old female with possible CAD (mild distal LAD ischemia on nuclear stress), HTN, breast CA, presents for followup. \par \par Patient was last seen on 1/12/23 - Patient reports COLLINS. Patient denies CP. Patient denies palpitations. Patient denies lightheadedness. Her BP in the morning is elevated and is 110 in the evenings.  [place]. I advised patient that she may stop ASA 81 mg and continue on statins. I advised patient to undergo an echocardiogram.  I advised patient to consider getting CTA.\par \par She is on ASA 81 mg and Simvastatin 10 mg for cardioprotection.  She is on Bisoprolol- HCTZ 5-6.25 mg and Losartan 25 mg for HTN.\par \par Patient underwent an echocardiogram 1/12/23 and it showed normal LV function with moderate AR.\par \par Patient underwent a pharmacologic nuclear stress test 3/1/22 and it showed normal myocardial perfusion. \par \par Patient underwent an echocardiogram 1/6/21 and it showed normal LV function with moderate AR and TR.\par \par Nuclear stress on 10/6/20 showed mild distal LAD ischemia. \par

## 2023-08-21 ENCOUNTER — APPOINTMENT (OUTPATIENT)
Dept: CARDIOLOGY | Facility: CLINIC | Age: 83
End: 2023-08-21
Payer: MEDICARE

## 2023-08-21 ENCOUNTER — NON-APPOINTMENT (OUTPATIENT)
Age: 83
End: 2023-08-21

## 2023-08-21 VITALS
BODY MASS INDEX: 23.22 KG/M2 | WEIGHT: 136 LBS | SYSTOLIC BLOOD PRESSURE: 169 MMHG | TEMPERATURE: 97.6 F | HEART RATE: 65 BPM | OXYGEN SATURATION: 95 % | RESPIRATION RATE: 16 BRPM | DIASTOLIC BLOOD PRESSURE: 78 MMHG

## 2023-08-21 PROCEDURE — 93000 ELECTROCARDIOGRAM COMPLETE: CPT

## 2023-08-21 PROCEDURE — 99214 OFFICE O/P EST MOD 30 MIN: CPT | Mod: 25

## 2023-08-21 NOTE — PHYSICAL EXAM
[General Appearance - Well Developed] : well developed [Normal Appearance] : normal appearance [Well Groomed] : well groomed [No Deformities] : no deformities [General Appearance - Well Nourished] : well nourished [General Appearance - In No Acute Distress] : no acute distress [Normal Conjunctiva] : the conjunctiva exhibited no abnormalities [Eyelids - No Xanthelasma] : the eyelids demonstrated no xanthelasmas [Normal Oral Mucosa] : normal oral mucosa [No Oral Pallor] : no oral pallor [No Oral Cyanosis] : no oral cyanosis [Normal Jugular Venous A Waves Present] : normal jugular venous A waves present [Normal Jugular Venous V Waves Present] : normal jugular venous V waves present [No Jugular Venous Rosenbaum A Waves] : no jugular venous rosenbaum A waves [Respiration, Rhythm And Depth] : normal respiratory rhythm and effort [Exaggerated Use Of Accessory Muscles For Inspiration] : no accessory muscle use [Auscultation Breath Sounds / Voice Sounds] : lungs were clear to auscultation bilaterally [Heart Rate And Rhythm] : heart rate and rhythm were normal [Heart Sounds] : normal S1 and S2 [Arterial Pulses Normal] : the arterial pulses were normal [Edema] : no peripheral edema present [Systolic grade ___/6] : A grade [unfilled]/6 systolic murmur was heard. [Abdomen Soft] : soft [Abdomen Tenderness] : non-tender [Abdomen Mass (___ Cm)] : no abdominal mass palpated [Abnormal Walk] : normal gait [Nail Clubbing] : no clubbing of the fingernails [Cyanosis, Localized] : no localized cyanosis [Petechial Hemorrhages (___cm)] : no petechial hemorrhages [] : no ischemic changes [Oriented To Time, Place, And Person] : oriented to person, place, and time [Affect] : the affect was normal [Mood] : the mood was normal [No Anxiety] : not feeling anxious

## 2023-08-22 NOTE — REASON FOR VISIT
[FreeTextEntry1] : 82 year-old female with possible CAD (mild distal LAD ischemia on nuclear stress), HTN, breast CA, presents for followup.   Patient was last seen on 7/13/23 - Patient reports Covid infection in June w. s/s of cough and fatigue. She still c/o occasional cough and running nose in am. Patient reports stable COLLINS. She denies CP or palpitations. She has been holding Losartan 25 mg because her BP was 90s occasionally. Her BP ranges 110-130s recently.  Patient last had BT on 5/2023. CTA of the coronary arteries showed severe stenosis of pLAD and mLAD. Patient informed. I advised patient to resume ASA 81 mg and continue statin therapy. Pt will discuss with family about cardiac cath.  She is on ASA 81 mg and Simvastatin 10 mg for cardioprotection.  She is on Bisoprolol- HCTZ 5-6.25 mg and Losartan 25 mg for HTN.  Patient underwent an echocardiogram 1/12/23 and it showed normal LV function with moderate AR.  Patient underwent a pharmacologic nuclear stress test 3/1/22 and it showed normal myocardial perfusion.   Patient underwent an echocardiogram 1/6/21 and it showed normal LV function with moderate AR and TR.  Nuclear stress on 10/6/20 showed mild distal LAD ischemia.

## 2023-08-22 NOTE — HISTORY OF PRESENT ILLNESS
[FreeTextEntry1] : 8/21/23 - Patient presents to discuss cardiac cath.  I discussed with pt the pros and cons of cardiac cath vs. medical therapy.  Patient is thinking of travelling.  I advised patient to undergo cardiac cath after she returns from travelling.  I advised patient to continue current medications.  FU 3 months.   7/13/23 - Patient reports Covid infection in June w. s/s of cough and fatigue. She still c/o occasional cough and running nose in am. Patient reports stable COLLINS. She denies CP or palpitations. She has been holding Losartan 25 mg because her BP was 90s occasionally. Her BP ranges 110-130s recently.  Patient last had BT on 5/2023.  CTA of the coronary arteries showed severe stenosis of pLAD and mLAD. Patient informed. I advised patient to resume ASA 81 mg and continue statin therapy. Pt will discuss with family about cardiac cath.  1/12/23 - Patient reports COLLNIS. Patient denies CP. Patient denies palpitations. Patient denies lightheadedness. Her BP in the morning is elevated and is 110 in the evenings.  [place]. I advised patient that she may stop ASA 81 mg and continue on statins. I advised patient to undergo an echocardiogram.  I advised patient to consider getting CTA.  2/16/22 - Patient reports COLLINS.  Patient reports dizziness.  Patient was orthostatic (-120-100).  2/6 BEATRICE at apex  noted.  She is on Bisoprolol-HCTZ 5-6.25 mg and Losartan 25 mg for HTN.  I advised patient to undergo a pharmacologic nuclear stress test.  I will obtain insurance authorization.  Patient underwent a pharmacologic nuclear stress test and it showed normal myocardial perfusion.  FU 6 months.   9/20/21 - Patient reports stable COLLINS.  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  Patient reports her SBP in AM is 140/80 and then it goes down to 110-120 in PM.  She is on Bisoprolol 5 mg and HCTZ.  Her BP was elevated.  Her home BP was verified to be accurate.  I advised patient to exercise regularly to see if her SOB gets better.  I will consider cardiac cath if she remains symptomatic.  FU 3 months.   5/19/21 - Patient reports low BP with Losartan so she stopped.  She is on Bisoprolol HCTZ only for HTN.  She is on ASA 81 mg and Simvastatin 10 mg for HTN.  Patient reports COLLINS.  Patient denies CP.  I advised patient to check ProBNP.  I advised patient to have a CXR to rule out lung pathology.  I will consider cardiac cath if CXR is normal.  1/6/21 - Patient has been stable.  Patient denies CP.  Patient reports stable SOB with stairs.  Patient denies palpitations.  She is taking her medications.  Her SBP at home ranges from 110-150.  Her BP was elevated initially but was acceptable on repeat measurement (140/80).  I advised patient to undergo an echocardiogram.  I advised patient to increase Losartan to 50 mg.  9/14/20 - stopped both Bisoprolol HCTZ 5-6.25 mg and Losartan 25 mg because her BP was good.  Her BP increased so she is back on both for the past month. Patient reports feeling dizzy when she turns.   Patient reports SOB with exertion. Patient denies CP.  I advised patient to get CXR. Patient had high pulmonary pressure due to mitral valve regurgitation. I advised patient to keep her BP low.  I advised patient to undergo a pharmacologic nuclear stress test.  I will obtain insurance authorization.   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  Patient reports elevated BP in the mornings. Patient is advised to take Losartan 25 mg at night instead of during the day to help lower blood pressure in the mornings.  She is on Bisoprolol 5 mg and HCTZ 12.5 mg.

## 2024-03-19 PROBLEM — I25.10 CAD (CORONARY ARTERY DISEASE), NATIVE CORONARY ARTERY: Status: ACTIVE | Noted: 2020-10-07

## 2024-03-19 PROBLEM — R93.1 ELEVATED CORONARY ARTERY CALCIUM SCORE: Status: ACTIVE | Noted: 2024-03-19

## 2024-03-19 PROBLEM — R93.1 ABNORMAL CARDIAC CT ANGIOGRAPHY: Status: ACTIVE | Noted: 2024-03-19

## 2024-03-19 NOTE — PHYSICAL EXAM
[General Appearance - Well Developed] : well developed [Well Groomed] : well groomed [Normal Appearance] : normal appearance [General Appearance - Well Nourished] : well nourished [No Deformities] : no deformities [General Appearance - In No Acute Distress] : no acute distress [Normal Conjunctiva] : the conjunctiva exhibited no abnormalities [Eyelids - No Xanthelasma] : the eyelids demonstrated no xanthelasmas [Normal Oral Mucosa] : normal oral mucosa [No Oral Pallor] : no oral pallor [No Oral Cyanosis] : no oral cyanosis [Normal Jugular Venous A Waves Present] : normal jugular venous A waves present [Normal Jugular Venous V Waves Present] : normal jugular venous V waves present [No Jugular Venous Rosenbaum A Waves] : no jugular venous rosenbaum A waves [Respiration, Rhythm And Depth] : normal respiratory rhythm and effort [Exaggerated Use Of Accessory Muscles For Inspiration] : no accessory muscle use [Auscultation Breath Sounds / Voice Sounds] : lungs were clear to auscultation bilaterally [Heart Rate And Rhythm] : heart rate and rhythm were normal [Heart Sounds] : normal S1 and S2 [Arterial Pulses Normal] : the arterial pulses were normal [Edema] : no peripheral edema present [Systolic grade ___/6] : A grade [unfilled]/6 systolic murmur was heard. [Abdomen Soft] : soft [Abdomen Tenderness] : non-tender [Abdomen Mass (___ Cm)] : no abdominal mass palpated [Abnormal Walk] : normal gait [Nail Clubbing] : no clubbing of the fingernails [Cyanosis, Localized] : no localized cyanosis [Petechial Hemorrhages (___cm)] : no petechial hemorrhages [] : no ischemic changes [Oriented To Time, Place, And Person] : oriented to person, place, and time [Affect] : the affect was normal [Mood] : the mood was normal [No Anxiety] : not feeling anxious

## 2024-03-21 ENCOUNTER — APPOINTMENT (OUTPATIENT)
Age: 84
End: 2024-03-21
Payer: MEDICARE

## 2024-03-21 ENCOUNTER — APPOINTMENT (OUTPATIENT)
Dept: CARDIOLOGY | Facility: CLINIC | Age: 84
End: 2024-03-21
Payer: MEDICARE

## 2024-03-21 VITALS
DIASTOLIC BLOOD PRESSURE: 74 MMHG | BODY MASS INDEX: 23.22 KG/M2 | RESPIRATION RATE: 16 BRPM | SYSTOLIC BLOOD PRESSURE: 129 MMHG | TEMPERATURE: 96.8 F | WEIGHT: 136 LBS | HEART RATE: 68 BPM | OXYGEN SATURATION: 95 %

## 2024-03-21 DIAGNOSIS — I10 ESSENTIAL (PRIMARY) HYPERTENSION: ICD-10-CM

## 2024-03-21 DIAGNOSIS — R93.1 ABNORMAL FINDINGS ON DIAGNOSTIC IMAGING OF HEART AND CORONARY CIRCULATION: ICD-10-CM

## 2024-03-21 DIAGNOSIS — R06.02 SHORTNESS OF BREATH: ICD-10-CM

## 2024-03-21 DIAGNOSIS — I25.10 ATHEROSCLEROTIC HEART DISEASE OF NATIVE CORONARY ARTERY W/OUT ANGINA PECTORIS: ICD-10-CM

## 2024-03-21 PROCEDURE — 99214 OFFICE O/P EST MOD 30 MIN: CPT

## 2024-03-21 PROCEDURE — 93306 TTE W/DOPPLER COMPLETE: CPT

## 2024-03-25 NOTE — REASON FOR VISIT
[FreeTextEntry1] : 83 year-old female with possible CAD (mild distal LAD ischemia on nuclear stress, severe LAD stenosis on CTA), HTN, breast CA, presents for followup.   Patient was last seen on 8/21/23 - Patient presents to discuss cardiac cath.  I discussed with pt the pros and cons of cardiac cath vs. medical therapy.  Patient is thinking of travelling.  I advised patient to undergo cardiac cath after she returns from travelling.  I advised patient to continue current medications.  FU 3 months.   She is on ASA 81 mg and Simvastatin 10 mg for cardioprotection.  She is on Bisoprolol- HCTZ 5-6.25 mg and Losartan 25 mg for HTN.  CTA of the coronary arteries 8/11/23 showed severe stenosis of pLAD and mLAD. I advised patient to resume ASA 81 mg and continue statin therapy. Pt will discuss with family about cardiac cath.  Patient underwent an echocardiogram 1/12/23 and it showed normal LV function with moderate AR.  Patient underwent a pharmacologic nuclear stress test 3/1/22 and it showed normal myocardial perfusion.   Patient underwent an echocardiogram 1/6/21 and it showed normal LV function with moderate AR and TR.  Nuclear stress on 10/6/20 showed mild distal LAD ischemia.

## 2024-03-25 NOTE — HISTORY OF PRESENT ILLNESS
[FreeTextEntry1] : 3/21/24 - Patient reports SOB with exertion.  Patient denies CP.  Patient reports that while in [place] she got sick with cough.  Patient reports that she was recently noted to have an enlarged lymph node s/p biopsy.  Result not known yet.  /74 HR 68.  Exam unremarkable.  I advised patient to undergo an echocardiogram.  I discussed with patient about cardiac cath.  Patient would like to wait for the result of lymph node biopsy before deciding.  I advised patient to continue ASA 81 mg and Simvastatin 10 mg for cardioprotection.  FU 3 months.   8/21/23 - Patient presents to discuss cardiac cath.  I discussed with pt the pros and cons of cardiac cath vs. medical therapy.  Patient is thinking of travelling.  I advised patient to undergo cardiac cath after she returns from travelling.  I advised patient to continue current medications.  FU 3 months.   7/13/23 - Patient reports Covid infection in June w. s/s of cough and fatigue. She still c/o occasional cough and running nose in am. Patient reports stable COLLINS. She denies CP or palpitations. She has been holding Losartan 25 mg because her BP was 90s occasionally. Her BP ranges 110-130s recently.  Patient last had BT on 5/2023.  CTA of the coronary arteries showed severe stenosis of pLAD and mLAD. Patient informed. I advised patient to resume ASA 81 mg and continue statin therapy. Pt will discuss with family about cardiac cath.  1/12/23 - Patient reports COLLINS. Patient denies CP. Patient denies palpitations. Patient denies lightheadedness. Her BP in the morning is elevated and is 110 in the evenings.  [place]. I advised patient that she may stop ASA 81 mg and continue on statins. I advised patient to undergo an echocardiogram.  I advised patient to consider getting CTA.  2/16/22 - Patient reports COLLINS.  Patient reports dizziness.  Patient was orthostatic (-120-100).  2/6 BEATRICE at apex  noted.  She is on Bisoprolol-HCTZ 5-6.25 mg and Losartan 25 mg for HTN.  I advised patient to undergo a pharmacologic nuclear stress test.  I will obtain insurance authorization.  Patient underwent a pharmacologic nuclear stress test and it showed normal myocardial perfusion.  FU 6 months.   9/20/21 - Patient reports stable COLLINS.  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  Patient reports her SBP in AM is 140/80 and then it goes down to 110-120 in PM.  She is on Bisoprolol 5 mg and HCTZ.  Her BP was elevated.  Her home BP was verified to be accurate.  I advised patient to exercise regularly to see if her SOB gets better.  I will consider cardiac cath if she remains symptomatic.  FU 3 months.   5/19/21 - Patient reports low BP with Losartan so she stopped.  She is on Bisoprolol HCTZ only for HTN.  She is on ASA 81 mg and Simvastatin 10 mg for HTN.  Patient reports COLLINS.  Patient denies CP.  I advised patient to check ProBNP.  I advised patient to have a CXR to rule out lung pathology.  I will consider cardiac cath if CXR is normal.  1/6/21 - Patient has been stable.  Patient denies CP.  Patient reports stable SOB with stairs.  Patient denies palpitations.  She is taking her medications.  Her SBP at home ranges from 110-150.  Her BP was elevated initially but was acceptable on repeat measurement (140/80).  I advised patient to undergo an echocardiogram.  I advised patient to increase Losartan to 50 mg.  9/14/20 - stopped both Bisoprolol HCTZ 5-6.25 mg and Losartan 25 mg because her BP was good.  Her BP increased so she is back on both for the past month. Patient reports feeling dizzy when she turns.   Patient reports SOB with exertion. Patient denies CP.  I advised patient to get CXR. Patient had high pulmonary pressure due to mitral valve regurgitation. I advised patient to keep her BP low.  I advised patient to undergo a pharmacologic nuclear stress test.  I will obtain insurance authorization.   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  Patient reports elevated BP in the mornings. Patient is advised to take Losartan 25 mg at night instead of during the day to help lower blood pressure in the mornings.  She is on Bisoprolol 5 mg and HCTZ 12.5 mg.

## 2024-06-27 ENCOUNTER — APPOINTMENT (OUTPATIENT)
Dept: CARDIOLOGY | Facility: CLINIC | Age: 84
End: 2024-06-27